# Patient Record
(demographics unavailable — no encounter records)

---

## 2025-06-11 NOTE — HISTORY OF PRESENT ILLNESS
[de-identified] : 10 month follow up visit for this 43 y/o F with h/o hypothyroidism p/w disequilibrium for the past week which she describes as if someone is yanking her leg. Worsens when she is walking outside. Has food poisoning prior to onset of symptom - now resolved. When she looked down, she would immediately feel nausea. Food poisoning resolved x3 days after. Also noted lipoma to back of neck for several years. Noticed that it would decrease in size when she loses weight. Uses flonase daily with relief of congestion. States that she has intermittent neck pain. Denies otalgia. Denies recent URI, fever, room spinning sensation, nasal congestion - - -

## 2025-06-11 NOTE — ASSESSMENT
[FreeTextEntry1] : 1. disequilibrium -less likely d/t inner ear  2. turbinate hypertrophy, inferior -discussed risks/benefits/alternatives of inferior turbinate reduction -she wished to hold off  3. neck mass likely d/t lipoma -d/t size of lipoma, advised to f/u with plastic surgery

## 2025-06-11 NOTE — PHYSICAL EXAM
[Midline] : trachea located in midline position [Normal] : no rashes [de-identified] : large lipoma noted to mid upper back, mobile/non tender [de-identified] : b inferior turbinate reduction L>R